# Patient Record
Sex: FEMALE
[De-identification: names, ages, dates, MRNs, and addresses within clinical notes are randomized per-mention and may not be internally consistent; named-entity substitution may affect disease eponyms.]

---

## 2024-05-14 ENCOUNTER — DASHBOARD ENCOUNTERS (OUTPATIENT)
Age: 49
End: 2024-05-14

## 2024-05-22 ENCOUNTER — OFFICE VISIT (OUTPATIENT)
Dept: URBAN - METROPOLITAN AREA CLINIC 74 | Facility: CLINIC | Age: 49
End: 2024-05-22

## 2024-06-19 ENCOUNTER — OFFICE VISIT (OUTPATIENT)
Dept: URBAN - METROPOLITAN AREA CLINIC 74 | Facility: CLINIC | Age: 49
End: 2024-06-19

## 2024-06-19 NOTE — HPI-TODAY'S VISIT:
The patient is 48-year-old female with past medical history as noted below is presenting to our clinic today to schedule Colonoscopy.

## 2024-09-04 ENCOUNTER — OFFICE VISIT (OUTPATIENT)
Dept: URBAN - METROPOLITAN AREA CLINIC 74 | Facility: CLINIC | Age: 49
End: 2024-09-04
Payer: COMMERCIAL

## 2024-09-04 ENCOUNTER — LAB OUTSIDE AN ENCOUNTER (OUTPATIENT)
Dept: URBAN - METROPOLITAN AREA CLINIC 74 | Facility: CLINIC | Age: 49
End: 2024-09-04

## 2024-09-04 VITALS
HEART RATE: 99 BPM | HEIGHT: 64 IN | OXYGEN SATURATION: 97 % | DIASTOLIC BLOOD PRESSURE: 86 MMHG | SYSTOLIC BLOOD PRESSURE: 132 MMHG | TEMPERATURE: 98.1 F | BODY MASS INDEX: 34.62 KG/M2 | WEIGHT: 202.8 LBS

## 2024-09-04 DIAGNOSIS — R49.0 HOARSENESS: ICD-10-CM

## 2024-09-04 DIAGNOSIS — Z86.010 PERSONAL HISTORY OF COLONIC POLYPS: ICD-10-CM

## 2024-09-04 DIAGNOSIS — K21.9 CHRONIC GERD: ICD-10-CM

## 2024-09-04 DIAGNOSIS — Z79.899 LONG-TERM CURRENT USE OF PROTON PUMP INHIBITOR THERAPY: ICD-10-CM

## 2024-09-04 PROBLEM — 428283002: Status: ACTIVE | Noted: 2024-09-04

## 2024-09-04 PROBLEM — 235595009: Status: ACTIVE | Noted: 2024-09-04

## 2024-09-04 PROCEDURE — 99203 OFFICE O/P NEW LOW 30 MIN: CPT | Performed by: PHYSICIAN ASSISTANT

## 2024-09-04 RX ORDER — FAMOTIDINE 40 MG/1
1 TABLET TABLET, FILM COATED ORAL ONCE A DAY
Refills: 0 | Status: ACTIVE | COMMUNITY

## 2024-09-04 RX ORDER — NORTRIPTYLINE HYDROCHLORIDE 75 MG/1
TAKE ONE CAPSULE BY MOUTH EVERY NIGHT FOR 30 DAYS CAPSULE ORAL
Qty: 30 UNSPECIFIED | Refills: 4 | Status: ACTIVE | COMMUNITY

## 2024-09-04 RX ORDER — FLUTICASONE PROPIONATE 220 UG/1
1 PUFF AEROSOL, METERED RESPIRATORY (INHALATION) TWICE A DAY
Status: ACTIVE | COMMUNITY

## 2024-09-04 RX ORDER — PREGABALIN 50 MG/1
TAKE ONE CAPSULE BY MOUTH TWICE A DAY AS NEEDED CAPSULE ORAL
Qty: 60 UNSPECIFIED | Refills: 1 | Status: ACTIVE | COMMUNITY

## 2024-09-04 RX ORDER — OMEPRAZOLE 40 MG/1
TAKE ONE CAPSULE BY MOUTH ONE TIME DAILY AS NEEDED 30 MINUTES BEFORE MORNING MEAL CAPSULE, DELAYED RELEASE ORAL
Qty: 30 UNSPECIFIED | Refills: 3 | Status: ACTIVE | COMMUNITY

## 2024-09-04 RX ORDER — ONDANSETRON HYDROCHLORIDE 4 MG/1
TAKE ONE TABLET BY MOUTH EVERY 4 HOURS AS NEEDED FOR 10 DAYS TABLET, FILM COATED ORAL
Qty: 48 UNSPECIFIED | Refills: 0 | Status: ON HOLD | COMMUNITY

## 2024-09-04 RX ORDER — POLYMYXIN B SULFATE AND TRIMETHOPRIM 10000; 1 [USP'U]/ML; MG/ML
INSTILL ONE DROP TO THE AFFECTED EYE(S) FOUR TIMES A DAY FOR 5 DAYS SOLUTION OPHTHALMIC
Qty: 10 UNSPECIFIED | Refills: 0 | Status: ON HOLD | COMMUNITY

## 2024-09-04 NOTE — HPI-TODAY'S VISIT:
The patient is 48-year-old female with past medical history as noted below is presenting to our clinic today to schedule Colonoscopy. Last Colonoscopy and EGD in 01/2021 with diagnosis of colon polyps and GERD. She has been on Omeprazole 40 mg in am and Famotidine 40 mg at bedtime. She continues to be symptomatic with sever GERD despite treatment. She has worsening hoarsness and burning in her chest. No family history of colon cancer or colon polyps.    -- The patient denies dysphagia, odynophagia, hemoptysis, hematemesis, nausea, vomiting, regurgitation, melena, constipation, diarrhea, abdominal pain, hematochezia, fever, chills, chest pain, SOB, or any other GI complaints today.   -- The patient denies ETOH, Tobacco, and Illicit drug use.   -- The patient is up to date with Flu and COVID vaccine.  -- Denies NSAID's.

## 2024-11-12 ENCOUNTER — OFFICE VISIT (OUTPATIENT)
Dept: URBAN - METROPOLITAN AREA SURGERY CENTER 30 | Facility: SURGERY CENTER | Age: 49
End: 2024-11-12

## 2024-12-10 ENCOUNTER — OFFICE VISIT (OUTPATIENT)
Dept: URBAN - METROPOLITAN AREA CLINIC 74 | Facility: CLINIC | Age: 49
End: 2024-12-10

## 2025-01-21 ENCOUNTER — OFFICE VISIT (OUTPATIENT)
Dept: URBAN - METROPOLITAN AREA SURGERY CENTER 30 | Facility: SURGERY CENTER | Age: 50
End: 2025-01-21

## 2025-01-28 ENCOUNTER — OFFICE VISIT (OUTPATIENT)
Dept: URBAN - METROPOLITAN AREA CLINIC 74 | Facility: CLINIC | Age: 50
End: 2025-01-28

## 2025-01-28 NOTE — HPI-TODAY'S VISIT:
The patient is 48-year-old female with past medical history as noted below known to Dr. Fleming is presenting to our clinic today to discuss her EGD and Colonoscopy results. The patient continues on Omeprazole 40 mg once in am and Famotidine 40 mg once at bedtime.